# Patient Record
Sex: MALE | Race: WHITE | URBAN - METROPOLITAN AREA
[De-identification: names, ages, dates, MRNs, and addresses within clinical notes are randomized per-mention and may not be internally consistent; named-entity substitution may affect disease eponyms.]

---

## 2017-06-02 ENCOUNTER — OFFICE VISIT (OUTPATIENT)
Dept: FAMILY MEDICINE CLINIC | Age: 57
End: 2017-06-02

## 2017-06-02 VITALS
DIASTOLIC BLOOD PRESSURE: 88 MMHG | HEART RATE: 90 BPM | TEMPERATURE: 97.4 F | RESPIRATION RATE: 18 BRPM | SYSTOLIC BLOOD PRESSURE: 140 MMHG | WEIGHT: 257 LBS | HEIGHT: 73 IN | OXYGEN SATURATION: 99 % | BODY MASS INDEX: 34.06 KG/M2

## 2017-06-02 DIAGNOSIS — L40.9 PSORIASIS: ICD-10-CM

## 2017-06-02 DIAGNOSIS — R03.0 PREHYPERTENSION: Primary | ICD-10-CM

## 2017-06-02 DIAGNOSIS — E78.5 HYPERLIPIDEMIA, MILD: ICD-10-CM

## 2017-06-02 DIAGNOSIS — Z12.11 COLON CANCER SCREENING: ICD-10-CM

## 2017-06-02 DIAGNOSIS — E66.9 OBESITY (BMI 30-39.9): ICD-10-CM

## 2017-06-02 DIAGNOSIS — Z87.891 HISTORY OF TOBACCO ABUSE: ICD-10-CM

## 2017-06-02 RX ORDER — SECUKINUMAB 150 MG/ML
INJECTION SUBCUTANEOUS
COMMUNITY
Start: 2017-05-16

## 2017-06-02 NOTE — PATIENT INSTRUCTIONS
Please ask your dermatologist and eye doctor to send me records  Especially the shots and labs         Learning About Low-Carbohydrate Diets for Weight Loss  What is a low-carbohydrate diet? Low-carb diets avoid foods that are high in carbohydrate. These high-carb foods include pasta, bread, rice, cereal, fruits, and starchy vegetables. Instead, these diets usually have you eat foods that are high in fat and protein. Many people lose weight quickly on a low-carb diet. But the early weight loss is water. People on this diet often gain the weight back after they start eating carbs again. Not all diet plans are safe or work well. A lot of the evidence shows that low-carb diets aren't healthy. That's because these diets often don't include healthy foods like fruits and vegetables. Losing weight safely means balancing protein, fat, and carbs with every meal and snack. And low-carb diets don't always provide the vitamins, minerals, and fiber you need. If you have a serious medical condition, talk to your doctor before you try any diet. These conditions include kidney disease, heart disease, type 2 diabetes, high cholesterol, and high blood pressure. If you are pregnant, it may not be safe for your baby if you are on a low-carb diet. How can you lose weight safely? You might have heard that a diet plan helped another person lose weight. But that doesn't mean that it will work for you. It is very hard to stay on a diet that includes lots of big changes in your eating habits. If you want to get to a healthy weight and stay there, making healthy lifestyle changes will often work better than dieting. These steps can help. · Make a plan for change. Work with your doctor to create a plan that is right for you. · See a dietitian. He or she can show you how to make healthy changes in your eating habits. · Manage stress.  If you have a lot of stress in your life, it can be hard to focus on making healthy changes to your daily habits. · Track your food and activity. You are likely to do better at losing weight if you keep track of what you eat and what you do. Follow-up care is a key part of your treatment and safety. Be sure to make and go to all appointments, and call your doctor if you are having problems. It's also a good idea to know your test results and keep a list of the medicines you take. Where can you learn more? Go to http://prabhu-josafat.info/. Enter A121 in the search box to learn more about \"Learning About Low-Carbohydrate Diets for Weight Loss. \"  Current as of: December 8, 2016  Content Version: 11.2  © 5841-5554 [employer], [employer]. Care instructions adapted under license by [employer] (which disclaims liability or warranty for this information). If you have questions about a medical condition or this instruction, always ask your healthcare professional. [address] any warranty or liability for your use of this information.

## 2017-06-02 NOTE — PROGRESS NOTES
Chief Complaint   Patient presents with   Jorge Jack Other     needs form for camp (father , son camp ) filled out. Denies issues. Reviewed Record in preparation for visit and have obtained necessary documentation. Identified pt with two pt identifiers (Name @ )    Health Maintenance Due   Topic    Pneumococcal 19-64 Medium Risk (1 of 1 - PPSV23)    DTaP/Tdap/Td series (1 - Tdap)    FOBT Q 1 YEAR AGE 50-75          1. Have you been to the ER, urgent care clinic since your last visit? Hospitalized since your last visit? No    2. Have you seen or consulted any other health care providers outside of the [address] [place] [address] since your last visit? Include any pap smears or colon screening.  No

## 2017-06-02 NOTE — PROGRESS NOTES
Luigi Rosales [address] [place] [address] [address]. [facility], [address]  [phone]             Date of visit: 6/2/2017   Subjective:      History obtained from:  the patient. Raimundo Sigala is a 49 y.o. male who presents today for follow up chronic problems and form for a camp physical    bp usually 130s/80s at home  On a low carb diet, started 2 mo ago, has lost 20 pounds  The no bread is hard for him but he does feel a lot more energetic so that helps  Taking the ocuvite but not other vitamins  Tries to limit salt      Sees eye doctor to monitor freckle in left eye  Every 2 years now  Sees eye doc at [facility] is Billie Christianson, will get labs there in 3 weeks  Psoriasis monthly injection working very well without side effects    He did quit smoking completely, so with that and normal BPs at home he is low enough risk to avoid a statin based on last yaers labs    He is sure he had TDAP approx 4/1/15 prior to starting Humira, maybe pneumonia shot too  Will get record from derm    Patient Active Problem List    Diagnosis Date Noted    History of tobacco abuse 06/02/2017    Prehypertension 06/02/2017    Hyperlipidemia, mild 06/24/2016    Psoriasis 06/23/2016    Elevated blood pressure (not hypertension) 06/22/2016    Obesity (BMI 30-39.9) 06/22/2016     Current Outpatient Prescriptions   Medication Sig Dispense Refill    COSENTYX PEN, 2 PENS, 150 mg/mL pnij       VIT C/VIT E/LUTEIN/MIN/OMEGA-3 (OCUVITE PO) Take  by mouth.        No Known Allergies  Past Medical History:   Diagnosis Date    Eye abnormality     left eye freckle    Psoriasis      Past Surgical History:   Procedure Laterality Date    HX HERNIA REPAIR  2946    umbilical     Family History   Problem Relation Age of Onset    Breast Cancer Mother     Hypertension Mother     Heart Disease Father      CHF, pacemaker    Breast Cancer Maternal Grandmother     Heart Disease Maternal Grandfather      Heart Failure    Arthritis-osteo Paternal Grandmother     Colon Cancer Paternal Uncle     Diabetes Neg Hx      Social History   Substance Use Topics    Smoking status: Former Smoker     Types: Cigars    Smokeless tobacco: Never Used      Comment: occasional cigar quit 2016    Alcohol use 0.0 oz/week     0 Standard drinks or equivalent per week      Comment: occ      Social History     Social History Narrative    Lives with wife and 6year-old son        Review of Systems  Card: denies chest pain  Pulm: denies shortness of breath  GI: denies abd pain or bowel changes   denies difficulty urinating or prostate problems      Objective:     Vitals:    06/02/17 1326   BP: 140/88   Pulse: 90   Resp: 18   Temp: 97.4 °F (36.3 °C)   TempSrc: Oral   SpO2: 99%   Weight: 257 lb (116.6 kg)   Height: 6' 1\" (1.854 m)     Body mass index is 33.91 kg/(m^2). General: stated age,obese, and in NAD  Eyes: PERRL, EOMI, no redness or drainage  Nose: no drainage  Mouth: no lesions  Throat: erythema, exudate or swelling  Neck: supple, symmetrical, trachea midline, no adenopathy and thyroid: not enlarged, symmetric, no tenderness/mass/nodules  Lungs:  clear to auscultation w/o rales, rhonchi, wheezes w/normal effort and no use of accessory muscles of respiration   Heart: regular rate and rhythm, S1, S2 normal, no murmur, click, rub or gallop  Abdomen: soft, nontender, no masses, BS normal  Ext:  No edema noted. Lymph: no cervical adenopathy appreciated  Skin:  Normal. and no rash or abnormalities   Neuro: normal gait  Psych: alert and oriented to person, place, time and situation and Speech: appropriate quality, quantity and organization of sentences    Assessment/Plan:       ICD-10-CM ICD-9-CM    1. Prehypertension R03.0 796.2    2. Obesity (BMI 30-39. 9) E66.9 278.00    3. Hyperlipidemia, mild E78.5 272.4    4. Colon cancer screening Z12.11 V76.51 REFERRAL FOR COLONOSCOPY   5. History of tobacco abuse Z87.891 V15.82    6.  Psoriasis L40.9 696.1 Orders Placed This Encounter    REFERRAL FOR COLONOSCOPY    COSENTYX PEN, 2 PENS, 150 mg/mL pnij       bp good enough consistently at home  He continues to lose weight with low carb diet, which he thinks he can stick to long term  He also is watching sodium  Will not start med now  Encouraged to monitor 2x monthly and keep me posted if going up  Will hold off on statin since he quit smoking and bp is better  encoruaged him to keep up excellent work with diet and exercise  Refer colonoscopy  Get shot record    Discussed the diagnosis and plan and he expressed understanding.     Follow-up Disposition:  Return in about 1 year (around 6/2/2018) for physical.    Jami Martinez MD

## 2017-06-02 NOTE — MR AVS SNAPSHOT
Visit Information Date & Time Provider Department Dept. Phone Encounter #  
 6/2/2017  1:30 PM Odette Mcclure, [address] [phone] [id number] Follow-up Instructions Return in about 1 year (around 6/2/2018) for physical.  
  
Upcoming Health Maintenance Date Due Pneumococcal 19-64 Medium Risk (1 of 1 - PPSV23) 10/2/1981 DTaP/Tdap/Td series (1 - Tdap) 10/2/1983 FOBT Q 1 YEAR AGE 50-75 10/2/2012 INFLUENZA AGE 9 TO ADULT 8/1/2017 Allergies as of 6/2/2017  Review Complete On: 6/2/2017 By: Odette Mcclure MD  
 No Known Allergies Current Immunizations  Never Reviewed No immunizations on file. Not reviewed this visit You Were Diagnosed With   
  
 Codes Comments Prehypertension    -  Primary ICD-10-CM: R03.0 ICD-9-CM: 796.2 Obesity (BMI 30-39. 9)     ICD-10-CM: E66.9 ICD-9-CM: 278.00 Hyperlipidemia, mild     ICD-10-CM: E78.5 ICD-9-CM: 272.4 Tobacco abuse     ICD-10-CM: Z72.0 ICD-9-CM: 305.1 Colon cancer screening     ICD-10-CM: Z12.11 ICD-9-CM: V76.51 Vitals BP Pulse Temp Resp Height(growth percentile) Weight(growth percentile) 140/88 (BP 1 Location: Right arm, BP Patient Position: Sitting) 90 97.4 °F (36.3 °C) (Oral) 18 6' 1\" (1.854 m) 257 lb (116.6 kg) SpO2 BMI Smoking Status 99% 33.91 kg/m2 Former Smoker Vitals History BMI and BSA Data Body Mass Index Body Surface Area  
 33.91 kg/m 2 2.45 m 2 Preferred Pharmacy Pharmacy Name Phone [facility] [address], [address] [phone] Your Updated Medication List  
  
   
This list is accurate as of: 6/2/17  2:13 PM.  Always use your most recent med list.  
  
  
  
  
 COSENTYX PEN (2 PENS) 150 mg/mL Pnij Generic drug:  secukinumab  
  
 OCUVITE PO Take  by mouth. We Performed the Following REFERRAL FOR COLONOSCOPY [ABB516 Custom] Comments:  
 Please evaluate patient for screening colonoscopy, patient is going to schedule himself, not sure of name of doctor, has to check Follow-up Instructions Return in about 1 year (around 6/2/2018) for physical.  
  
  
Referral Information Referral ID Referred By Referred To  
  
 [id number] Asim Malin Not Available Visits Status Start Date End Date 1 New Request 6/2/17 6/2/18 If your referral has a status of pending review or denied, additional information will be sent to support the outcome of this decision. Patient Instructions Please ask your dermatologist and eye doctor to send me records Especially the shots and labs Learning About Low-Carbohydrate Diets for Weight Loss What is a low-carbohydrate diet? Low-carb diets avoid foods that are high in carbohydrate. These high-carb foods include pasta, bread, rice, cereal, fruits, and starchy vegetables. Instead, these diets usually have you eat foods that are high in fat and protein. Many people lose weight quickly on a low-carb diet. But the early weight loss is water. People on this diet often gain the weight back after they start eating carbs again. Not all diet plans are safe or work well. A lot of the evidence shows that low-carb diets aren't healthy. That's because these diets often don't include healthy foods like fruits and vegetables. Losing weight safely means balancing protein, fat, and carbs with every meal and snack. And low-carb diets don't always provide the vitamins, minerals, and fiber you need. If you have a serious medical condition, talk to your doctor before you try any diet. These conditions include kidney disease, heart disease, type 2 diabetes, high cholesterol, and high blood pressure. If you are pregnant, it may not be safe for your baby if you are on a low-carb diet. How can you lose weight safely? You might have heard that a diet plan helped another person lose weight. But that doesn't mean that it will work for you. It is very hard to stay on a diet that includes lots of big changes in your eating habits. If you want to get to a healthy weight and stay there, making healthy lifestyle changes will often work better than dieting. These steps can help. · Make a plan for change. Work with your doctor to create a plan that is right for you. · See a dietitian. He or she can show you how to make healthy changes in your eating habits. · Manage stress. If you have a lot of stress in your life, it can be hard to focus on making healthy changes to your daily habits. · Track your food and activity. You are likely to do better at losing weight if you keep track of what you eat and what you do. Follow-up care is a key part of your treatment and safety. Be sure to make and go to all appointments, and call your doctor if you are having problems. It's also a good idea to know your test results and keep a list of the medicines you take. Where can you learn more? Go to http://prabhu-josafat.info/. Enter A121 in the search box to learn more about \"Learning About Low-Carbohydrate Diets for Weight Loss. \" Current as of: December 8, 2016 Content Version: 11.2 © 5422-8465 [employer]. Care instructions adapted under license by [employer] (which disclaims liability or warranty for this information). If you have questions about a medical condition or this instruction, always ask your healthcare professional. [address] any warranty or liability for your use of this information. Introducing [facility] [place] [facility]! Dear Charisse Carrel: Thank you for requesting a [employer] account. Our records indicate that you already have an active [employer] account. You can access your account anytime at [email or web address]. [employer] Did you know that you can access your hospital and ER discharge instructions at any time in [employer]? You can also review all of your test results from your hospital stay or ER visit. Additional Information If you have questions, please visit the Frequently Asked Questions section of the [employer] website at [email or web address] Remember, Cortex is NOT to be used for urgent needs. For medical emergencies, dial 911. Now available from your iPhone and Android! Please provide this summary of care documentation to your next provider. Your primary care clinician is listed as Jose Kim. If you have any questions after today's visit, please call [phone].

## 2017-07-13 ENCOUNTER — PATIENT MESSAGE (OUTPATIENT)
Dept: FAMILY MEDICINE CLINIC | Age: 57
End: 2017-07-13

## 2017-07-13 DIAGNOSIS — R74.01 ELEVATED TRANSAMINASE LEVEL: Primary | ICD-10-CM

## 2022-03-19 PROBLEM — Z87.891 HISTORY OF TOBACCO ABUSE: Status: ACTIVE | Noted: 2017-06-02

## 2022-03-19 PROBLEM — R03.0 PREHYPERTENSION: Status: ACTIVE | Noted: 2017-06-02

## 2022-03-19 PROBLEM — R74.01 ELEVATED TRANSAMINASE LEVEL: Status: ACTIVE | Noted: 2017-07-13

## 2023-05-18 RX ORDER — SECUKINUMAB 150 MG/ML
INJECTION SUBCUTANEOUS
COMMUNITY
Start: 2017-05-16